# Patient Record
Sex: MALE | Race: ASIAN | NOT HISPANIC OR LATINO | URBAN - METROPOLITAN AREA
[De-identification: names, ages, dates, MRNs, and addresses within clinical notes are randomized per-mention and may not be internally consistent; named-entity substitution may affect disease eponyms.]

---

## 2021-02-16 ENCOUNTER — EMERGENCY (EMERGENCY)
Facility: HOSPITAL | Age: 41
LOS: 1 days | Discharge: HOME | End: 2021-02-16
Admitting: EMERGENCY MEDICINE
Payer: SELF-PAY

## 2021-02-16 VITALS
DIASTOLIC BLOOD PRESSURE: 76 MMHG | SYSTOLIC BLOOD PRESSURE: 117 MMHG | WEIGHT: 125.66 LBS | HEART RATE: 79 BPM | TEMPERATURE: 98 F | RESPIRATION RATE: 18 BRPM | OXYGEN SATURATION: 99 % | HEIGHT: 64 IN

## 2021-02-16 DIAGNOSIS — M25.571 PAIN IN RIGHT ANKLE AND JOINTS OF RIGHT FOOT: ICD-10-CM

## 2021-02-16 PROCEDURE — 73610 X-RAY EXAM OF ANKLE: CPT

## 2021-02-16 PROCEDURE — 73610 X-RAY EXAM OF ANKLE: CPT | Mod: 26,RT

## 2021-02-16 PROCEDURE — 99284 EMERGENCY DEPT VISIT MOD MDM: CPT

## 2021-02-16 PROCEDURE — 73620 X-RAY EXAM OF FOOT: CPT | Mod: 26,RT

## 2021-02-16 PROCEDURE — 73630 X-RAY EXAM OF FOOT: CPT

## 2021-02-16 PROCEDURE — 73630 X-RAY EXAM OF FOOT: CPT | Mod: 26,RT

## 2021-02-16 PROCEDURE — 99283 EMERGENCY DEPT VISIT LOW MDM: CPT | Mod: 25

## 2021-02-16 NOTE — ED PROVIDER NOTE - OBJECTIVE STATEMENT
41 y/o healthy M presents to the ED c/o R ankle and foot pain for the past 2.5 months after an inversion injury. Pt states he was running and inverted his ankle, and initially had bruising and swelling of the ankle and foot. Pt went to a hospital in [place], [place], and was told he needed an MRI and was given a cam boot, but pt unclear on whether or not anything was broken or dislocated. Pt wore the cam boot for a while, but when traveling back to [place] did not have enough room to pack it and has been wearing regular shoes since. Reports increased pain since. Pt never followed up outpt. Has been taking OTC meds with some improvement. Denies fever, chills, open wounds, chest pain, SOB, calf pain or swelling, numbness, weakness, tingling, or recurrent injury.

## 2021-02-16 NOTE — ED ADULT NURSE NOTE - OBJECTIVE STATEMENT
41 yo male c/o right ankle pain s/p fall in November 2020. Pt. reports he had a mechanical fall in November and c/o persistent pain to right foot that increases with ambulation, but pt. still able to ambulate. Denies chest pain, SOB, calf pain, numbness/tingling.

## 2021-02-16 NOTE — ED PROVIDER NOTE - PHYSICAL EXAMINATION
Vitals reviewed  Gen: well appearing, nad, speaking in full sentences  Skin: wwp, no rash/lesions  HEENT: ncat, eomi, mmm  CV: rrr, no audible m/r/g  Resp: symmetrical expansion, ctab, no w/r/r  Ext: RLE w/ minimal swelling to the lateral malleolus and dorsum of the foot, tenderness just inferior to the lateral malleolus, diffuse tenderness along the mid foot, FROM of ankle, knee, and toes, dp/pt pulse 2+, sensation intact to light touch, cap refill <2seconds, no calf tenderness, soft compartments. FROM of all other extremities, no peripheral edema.  Neuro: alert/oriented, no focal deficits, steady gait Vitals reviewed  Gen: well appearing, nad, speaking in full sentences  Skin: wwp, no rash/lesions  HEENT: ncat, eomi, mmm  CV: rrr, no audible m/r/g  Resp: symmetrical expansion, ctab, no w/r/r  RLE: minimal swelling to the lateral malleolus and dorsum of the foot, tenderness just inferior to the lateral malleolus, diffuse tenderness along the mid foot, FROM of ankle, knee, and toes, dp/pt pulse 2+, sensation intact to light touch, cap refill <2seconds, no calf tenderness, soft compartments. FROM of all other extremities, no peripheral edema.  Neuro: alert/oriented, no focal deficits, steady gait

## 2021-02-16 NOTE — ED ADULT NURSE NOTE - NSIMPLEMENTINTERV_GEN_ALL_ED
Implemented All Fall Risk Interventions:  [place] to call system. Call bell, personal items and telephone within reach. Instruct patient to call for assistance. Room bathroom lighting operational. Non-slip footwear when patient is off stretcher. Physically safe environment: no spills, clutter or unnecessary equipment. Stretcher in lowest position, wheels locked, appropriate side rails in place. Provide visual cue, wrist band, yellow gown, etc. Monitor gait and stability. Monitor for mental status changes and reorient to person, place, and time. Review medications for side effects contributing to fall risk. Reinforce activity limits and safety measures with patient and family.

## 2021-02-16 NOTE — ED ADULT TRIAGE NOTE - CHIEF COMPLAINT QUOTE
Pt presents reporting he tripped and fell  in november, states he has persistent pain to the right foot. Pain worse when walking, presents ambulating unassisted.

## 2021-02-16 NOTE — ED PROVIDER NOTE - NSFOLLOWUPINSTRUCTIONS_ED_ALL_ED_FT
Take tylenol 650mg or motrin 400-800mg as needed every 4-6 hours for pain.   REST- Rest your hurting/injuried joint or extremity to decrease pain and swelling for 24-48 hours    ICE- Apply ice to area of pain to decreased inflammation and pain, put towel/barrier between ice and skin. You can keep ice on for 20 minutes at a time 4-8 times daily   COMPRESSION- Wear ace wrap or brace for support to reduce swelling.  Make sure not to wrap too tight, loosen if skin feeling numb/tingling or skin turns blue   ELEVATION- Elevate hurting/injured area 6 or more inches about level of heart to decrease swelling/inflammation.  Use pillow under joint to elevate area    [facility] (Thursday afternoons) - call [phone] to schedule      Ankle Sprain    WHAT YOU NEED TO KNOW:    An ankle sprain happens when 1 or more ligaments in your ankle joint stretch or tear. Ligaments are tough tissues that connect bones. Ligaments support your joints and keep your bones in place.    DISCHARGE INSTRUCTIONS:    Seek care immediately if:   •You have severe pain in your ankle.      •Your foot or toes are cold or numb.      •Your ankle becomes more weak or unstable (wobbly).      •You are unable to put any weight on your ankle or foot.      •Your swelling has increased or returned.      Call your doctor if:   •Your pain does not go away, even after treatment.      •You have questions or concerns about your condition or care.      Medicines: You may need any of the following:   •NSAIDs, such as ibuprofen, help decrease swelling, pain, and fever. This medicine is available with or without a doctor's order. NSAIDs can cause stomach bleeding or kidney problems in certain people. If you take blood thinner medicine, always ask your healthcare provider if NSAIDs are safe for you. Always read the medicine label and follow directions.      •Acetaminophen decreases pain and fever. It is available without a doctor's order. Ask how much to take and how often to take it. Follow directions. Read the labels of all other medicines you are using to see if they also contain acetaminophen, or ask your doctor or pharmacist. Acetaminophen can cause liver damage if not taken correctly. Do not use more than 4 grams (4,000 milligrams) total of acetaminophen in one day.       •Prescription pain medicine may be given. Ask your healthcare provider how to take this medicine safely. Some prescription pain medicines contain acetaminophen. Do not take other medicines that contain acetaminophen without talking to your healthcare provider. Too much acetaminophen may cause liver damage. Prescription pain medicine may cause constipation. Ask your healthcare provider how to prevent or treat constipation.       •Take your medicine as directed. Contact your healthcare provider if you think your medicine is not helping or if you have side effects. Tell him or her if you are allergic to any medicine. Keep a list of the medicines, vitamins, and herbs you take. Include the amounts, and when and why you take them. Bring the list or the pill bottles to follow-up visits. Carry your medicine list with you in case of an emergency.      Self-care:   •Use support devices, such as a brace, cast, or splint, to limit your movement and protect your joint. You may need to use crutches to decrease your pain as you move around.      •Go to physical therapy as directed. A physical therapist teaches you exercises to help improve movement and strength, and to decrease pain.      •Rest your ankle so that it can heal. Return to normal activities as directed.      •Apply ice on your ankle for 15 to 20 minutes every hour or as directed. Use an ice pack, or put crushed ice in a plastic bag. Cover it with a towel. Ice helps prevent tissue damage and decreases swelling and pain.      •Compress your ankle. Ask if you should wrap an elastic bandage around your injured ligament. An elastic bandage provides support and helps decrease swelling and movement so your joint can heal. Wear as long as directed.  How to Wrap an Elastic Bandage           •Elevate your ankle above the level of your heart as often as you can. This will help decrease swelling and pain. Prop your ankle on pillows or blankets to keep it elevated comfortably.   Elevate Leg           Prevent another ankle sprain:   •Let your ankle heal. Find out how long your ligament needs to heal. Do not do any physical activity until your healthcare provider says it is okay. If you start activity too soon, you may develop a more serious injury.      •Always warm up and stretch before you exercise or play sports.      •Use the right equipment. Always wear shoes that fit well and are made for the activity that you are doing. You may also need ankle supports, elbow and knee pads, or braces.      Follow up with your doctor as directed: Write down your questions so you remember to ask them during your visits.

## 2021-02-16 NOTE — ED PROVIDER NOTE - CHPI ED SYMPTOMS NEG
no chills,  no open wounds, no chest pain, no SOB, no calf pain, no calf swelling, no recurrent injury/no fever/no numbness/no tingling/no weakness

## 2021-02-16 NOTE — ED PROVIDER NOTE - PATIENT PORTAL LINK FT
You can access the FollowMyHealth Patient Portal offered by [facility] by registering at the following website: [email or web address]. By joining [employer]’s FollowMyHealth portal, you will also be able to view your health information using other applications (apps) compatible with our system.

## 2021-02-16 NOTE — ED PROVIDER NOTE - CLINICAL SUMMARY MEDICAL DECISION MAKING FREE TEXT BOX
39 y/o healthy M presents to the ED c/o R ankle and foot pain for the past 2.5 months after an inversion injury.  on exam + swelling/ttp L lateral midfoot and inferior to lat malleolus, NVI w/ FROM.  xray shows no acute fracture or dislocation.  likely healing ankle sprain as current exam appears to be improved from picture pt shows on phone from 2 mons ago.  educated on RICE and given outpt ortho referral.  discussed strict return parameters